# Patient Record
Sex: MALE | Race: WHITE | NOT HISPANIC OR LATINO | URBAN - METROPOLITAN AREA
[De-identification: names, ages, dates, MRNs, and addresses within clinical notes are randomized per-mention and may not be internally consistent; named-entity substitution may affect disease eponyms.]

---

## 2017-01-01 ENCOUNTER — HOME CARE/HOSPICE - HEALTHEAST (OUTPATIENT)
Dept: HOSPICE | Facility: HOSPICE | Age: 82
End: 2017-01-01

## 2017-01-01 ENCOUNTER — AMBULATORY - HEALTHEAST (OUTPATIENT)
Dept: HOSPICE | Facility: HOSPICE | Age: 82
End: 2017-01-01

## 2017-01-01 ENCOUNTER — AMBULATORY - HEALTHEAST (OUTPATIENT)
Dept: GERIATRICS | Facility: CLINIC | Age: 82
End: 2017-01-01

## 2017-01-01 ENCOUNTER — OFFICE VISIT - HEALTHEAST (OUTPATIENT)
Dept: GERIATRICS | Facility: CLINIC | Age: 82
End: 2017-01-01

## 2017-01-01 DIAGNOSIS — M54.50 CHRONIC LOW BACK PAIN: ICD-10-CM

## 2017-01-01 DIAGNOSIS — G89.29 CHRONIC LOW BACK PAIN: ICD-10-CM

## 2017-01-01 DIAGNOSIS — R63.4 WEIGHT LOSS: ICD-10-CM

## 2017-01-01 DIAGNOSIS — F32.89 DEPRESSIVE DISORDER, NOT ELSEWHERE CLASSIFIED: ICD-10-CM

## 2017-01-01 DIAGNOSIS — M48.061 SPINAL STENOSIS OF LUMBAR REGION: ICD-10-CM

## 2017-01-01 DIAGNOSIS — F03.92 DEMENTIA WITH PSYCHOSIS (H): ICD-10-CM

## 2017-01-01 DIAGNOSIS — R05.9 COUGH: ICD-10-CM

## 2017-01-01 DIAGNOSIS — R50.9 FEVER: ICD-10-CM

## 2017-01-01 DIAGNOSIS — R64 CACHEXIA (H): ICD-10-CM

## 2017-01-01 DIAGNOSIS — J98.4 PNEUMONITIS: ICD-10-CM

## 2017-01-01 DIAGNOSIS — R32 URINARY INCONTINENCE: ICD-10-CM

## 2017-01-01 DIAGNOSIS — R29.6 FALLS: ICD-10-CM

## 2017-01-01 RX ORDER — AMOXICILLIN 250 MG
1 CAPSULE ORAL 2 TIMES DAILY
Status: SHIPPED | COMMUNITY
Start: 2017-01-01

## 2017-01-01 RX ORDER — METAXALONE 800 MG/1
800 TABLET ORAL AT BEDTIME
Status: SHIPPED | COMMUNITY
Start: 2017-01-01

## 2017-01-01 RX ORDER — METAXALONE 800 MG/1
800 TABLET ORAL 3 TIMES DAILY PRN
Status: SHIPPED | COMMUNITY
Start: 2017-01-01

## 2017-01-01 RX ORDER — BISACODYL 10 MG
10 SUPPOSITORY, RECTAL RECTAL DAILY
Status: SHIPPED | COMMUNITY
Start: 2017-01-01

## 2017-01-01 RX ORDER — MORPHINE SULFATE 100 MG/5ML
5 SOLUTION ORAL
Status: SHIPPED | COMMUNITY
Start: 2017-01-01

## 2017-01-01 RX ORDER — MORPHINE SULFATE 100 MG/5ML
5 SOLUTION ORAL EVERY 4 HOURS
Status: SHIPPED | COMMUNITY
Start: 2017-01-01

## 2017-01-01 RX ORDER — TAMSULOSIN HYDROCHLORIDE 0.4 MG/1
0.4 CAPSULE ORAL AT BEDTIME
Status: SHIPPED | COMMUNITY
Start: 2017-01-01

## 2017-01-01 ASSESSMENT — MIFFLIN-ST. JEOR: SCORE: 1294.43

## 2017-01-26 ENCOUNTER — HOME CARE/HOSPICE - HEALTHEAST (OUTPATIENT)
Dept: HOSPICE | Facility: HOSPICE | Age: 82
End: 2017-01-26

## 2017-01-26 ENCOUNTER — AMBULATORY - HEALTHEAST (OUTPATIENT)
Dept: GERIATRICS | Facility: CLINIC | Age: 82
End: 2017-01-26

## 2021-05-24 ENCOUNTER — RECORDS - HEALTHEAST (OUTPATIENT)
Dept: ADMINISTRATIVE | Facility: CLINIC | Age: 86
End: 2021-05-24

## 2021-05-25 ENCOUNTER — RECORDS - HEALTHEAST (OUTPATIENT)
Dept: ADMINISTRATIVE | Facility: CLINIC | Age: 86
End: 2021-05-25

## 2021-05-29 ENCOUNTER — RECORDS - HEALTHEAST (OUTPATIENT)
Dept: ADMINISTRATIVE | Facility: CLINIC | Age: 86
End: 2021-05-29

## 2021-05-30 ENCOUNTER — RECORDS - HEALTHEAST (OUTPATIENT)
Dept: ADMINISTRATIVE | Facility: CLINIC | Age: 86
End: 2021-05-30

## 2021-05-30 VITALS — HEIGHT: 72 IN | WEIGHT: 137 LBS | BODY MASS INDEX: 18.56 KG/M2

## 2021-05-31 ENCOUNTER — RECORDS - HEALTHEAST (OUTPATIENT)
Dept: ADMINISTRATIVE | Facility: CLINIC | Age: 86
End: 2021-05-31

## 2021-06-08 NOTE — PROGRESS NOTES
"Code Status:  FULL CODE  Visit Type: Problem Visit     Facility:  [facility] [[id number]]         Facility Type:  (Long Term Care, LTC)    History of Present Illness: Alireza Keith is a 89 y.o. male who I am seeing today at the request of the nursing staff. Pt with hx of  LS with worsening back pain, DJD, lymphedema, hypertension, depressive symptoms,ncreasing cognitive impairment with psychosis and mx falls. Pt with temperature the last 2 days. UA sent this am. Pt with very poor oral intake. Spending most of his time in bed. Today he presents with wet nonproductive cough. Oxygen sats in the low 90s. Nasal congestion. Recent loose stools X 2.       Active Ambulatory Problems     Diagnosis Date Noted     Depression      Familial (Benign Essential) Tremor      Hypertension      Coronary Artery Disease      Diastolic Dysfunction      HTN (hypertension) 12/09/2014     CAD (coronary artery disease) 12/09/2014     Back pain 12/10/2014     Encephalopathy 04/16/2015     Other cognitive disorder due to general medical condition 04/16/2015     Dementia 04/16/2015     Chronic low back pain 04/18/2015     Spinal stenosis of lumbar region 04/18/2015     Dyspepsia 04/18/2015     Encephalopathy, unspecified 05/25/2015     Mood disorder in conditions classified elsewhere 05/25/2015     Cognitive retention disorder 05/25/2015     Lymphedema 11/10/2015     Anemia 11/10/2015     Weight loss 11/10/2015     Hyperlipidemia 11/10/2015     Resolved Ambulatory Problems     Diagnosis Date Noted     Chest pain 12/09/2014     Abdominal pain 04/15/2015     Acute constipation 04/18/2015     Confused 05/24/2015     Past Medical History   Diagnosis Date     Depression      Familial tremor      HLD (hyperlipidemia)      Hypothyroidism      Lumbar stenosis      Memory difficulty      RBBB      Shortness of breath 05/24/2015     Meds reviewed at the facility.     Allergies   Allergen Reactions     Oxycodone      \"ornery\"     " "Oxycodone-Acetaminophen      \"ornery\"     Review of Systems   Refer to HPI.     Physical Exam  PHYSICAL EXAMINATION:  Vital signs:   Vitals:    01/04/17 1919   BP: 109/50   Pulse: 72   Resp: 18   Temp: 100.3  F (37.9  C)   SpO2: 90%     General: Awake, Alert first lying in bed but then I assist him to a chair in the room. He is unable to follow simple directions and needs several cues and prompting.   HEENT:PERRLA,  moist oral mucosa, wears glasses. Bilateral hearing aides  NECK: Supple  CVS:  S1  S2, without murmur or gallop.   LUNG: Clear to auscultation, No wheezes, rales or rhonci.  ABDOMEN: Soft, nontender to palpation, with positive bowel sounds  BACK: No focal tenderness over the lumbar spine. Lateral  Kyphosis with stooped posture.   EXTREMITIES: Good range of motion. Moves both upper and lower extremities with generalized weakness, Trace LE edema.   PSYCHIATRIC: Increasing cognitive impairment. Very confused on exam today, rambling nonsensically. Continued depressive symptoms with negative statements towards death and dying. He is very upset over the fact he is loosing control and unable to remember things.     Labs:    Lab Results   Component Value Date    WBC 5.2 07/07/2016    HGB 10.6 (L) 07/07/2016    HCT 34.2 (L) 07/07/2016     (H) 07/07/2016    PLT 86 (L) 07/07/2016     Lab Results   Component Value Date    HGBA1C 5.2 11/11/2015       .    Assessment and Plan:    1. Fever     2. Cough     3. Urinary incontinence     4. Dementia with psychosis     5. Falls     6. Depression     7. Spinal stenosis of lumbar region     8. Chronic low back pain     9. Weight loss       Pt with advanced dementia with progressive decline including weight loss, increasing behaviors and cognitive impairment with recent mx falls. Pt Celexa decreased on 12/30 by Dr. Goodson. No further falls. Pt had 2 recent loose stools. GI like illness in building. Bowel meds held and loose stools resolved. UA/UC ordered yesterday due to " fever. UA sent this am. Pt continues with temperature. Little appetite over 2 days. No emesis or vomiting. Today with low oxygen sats and wet non productive cough. Nasal congestion. Will treat with oral Levaquin for pneumonitis. Will await results of UA. Will also follow up with laboratory in am due to poor oral intake. Daughter Robinson updated.     Electronically signed by: Jane Pierre, YANI

## 2021-06-08 NOTE — PROGRESS NOTES
Code Status:  FULL CODE  Visit Type: Problem Visit     Facility:  [facility] [[id number]]         Facility Type:  (Long Term Care, LTC)    History of Present Illness: Alireza Keith is a 89 y.o. male who I am seeing today at the request of the nursing staff. Pt with hx of  LS with worsening back pain, DJD, lymphedema, hypertension, depressive symptoms,ncreasing cognitive impairment with psychosis and mx falls. Pt seen last week for temperature and cough. Laboratory pretty unremarkable however empirically pt treated with antibiotics. He did have loose stools X 2. No further. He has continued to decline with very little oral intake and spending most of his days in bed. Today I find him in bed in the fetal position, very confused, and he has stripped all of his clothing off. He is very thin and cachetic looking.       Active Ambulatory Problems     Diagnosis Date Noted     Depression      Familial (Benign Essential) Tremor      Hypertension      Coronary Artery Disease      Diastolic Dysfunction      HTN (hypertension) 12/09/2014     CAD (coronary artery disease) 12/09/2014     Back pain 12/10/2014     Encephalopathy 04/16/2015     Other cognitive disorder due to general medical condition 04/16/2015     Dementia 04/16/2015     Chronic low back pain 04/18/2015     Spinal stenosis of lumbar region 04/18/2015     Dyspepsia 04/18/2015     Encephalopathy, unspecified 05/25/2015     Mood disorder in conditions classified elsewhere 05/25/2015     Cognitive retention disorder 05/25/2015     Lymphedema 11/10/2015     Anemia 11/10/2015     Weight loss 11/10/2015     Hyperlipidemia 11/10/2015     Resolved Ambulatory Problems     Diagnosis Date Noted     Chest pain 12/09/2014     Abdominal pain 04/15/2015     Acute constipation 04/18/2015     Confused 05/24/2015     Past Medical History   Diagnosis Date     Depression      Familial tremor      HLD (hyperlipidemia)      Hypothyroidism      Lumbar stenosis      Memory  "difficulty      RBBB      Shortness of breath 05/24/2015     Meds reviewed at the facility.     Allergies   Allergen Reactions     Oxycodone      \"ornery\"     Oxycodone-Acetaminophen      \"ornery\"     Review of Systems   Refer to HPI.     Physical Exam  PHYSICAL EXAMINATION:  Vital signs:   Vitals:    01/12/17 2130   BP: 124/66   Pulse: 78   Resp: 18   Temp: 98.1  F (36.7  C)   SpO2: 97%     General: Awake, Alert very confused lying in bed in fetal position, stripped of all his clothing and blankets.   HEENT:PERRLA,  Dry oral mucosa. Sunken cheeks.   NECK: Supple  CVS:  S1  S2, without murmur or gallop.   LUNG: Clear to auscultation, No wheezes, rales or rhonci.  ABDOMEN: Soft, thin with rib bones exposed with mx bony prominences. nontender to palpation, with positive bowel sounds  BACK: No focal tenderness over the lumbar spine. Lateral  Kyphosis.Bony prominences protruding outward.   EXTREMITIES: Contracted. Thin frail extremities.   PSYCHIATRIC: Increasing cognitive impairment. Very confused on exam today, rambling nonsensically.     Labs:    Lab Results   Component Value Date    WBC 8.4 01/05/2017    HGB 10.5 (L) 01/05/2017    HCT 32.9 (L) 01/05/2017     (H) 01/05/2017    PLT 97 (L) 01/05/2017     Lab Results   Component Value Date    HGBA1C 5.2 11/11/2015       .    Assessment and Plan:  1. Dementia with psychosis     2. Weight loss     3. Cachexia     4. Pneumonitis     5. Spinal stenosis of lumbar region     6. Chronic low back pain         Pt with advancing cognitive impairment with continued weight loss. Last week he had cough and loose stools. He was treated for pneumonitis.  Laboratory unremarkable. He was treated empirically with oral antibiotics without improvement. He has spent most of his days in bed. Very poor oral intake. Pt very cachetic today. He is very confused. I spoke with his daughter Robinson today and she is okay with hospice. I will order Hospice eval and treat. I will go ahead and " institute Morphine at this time going with low dose. He becomes very agitated with staff assisted cares.     Total time spent for this visit was 45 minutes with >50% of time spent in counseling and coordination of care reviewing above plan of care.     Electronically signed by: Jane Pierre CNP